# Patient Record
Sex: MALE | Race: WHITE | NOT HISPANIC OR LATINO | Employment: FULL TIME | URBAN - METROPOLITAN AREA
[De-identification: names, ages, dates, MRNs, and addresses within clinical notes are randomized per-mention and may not be internally consistent; named-entity substitution may affect disease eponyms.]

---

## 2021-12-27 ENCOUNTER — TELEPHONE (OUTPATIENT)
Dept: ORTHOPEDICS | Facility: CLINIC | Age: 31
End: 2021-12-27
Payer: COMMERCIAL

## 2022-01-03 ENCOUNTER — HOSPITAL ENCOUNTER (OUTPATIENT)
Dept: RADIOLOGY | Facility: HOSPITAL | Age: 32
Discharge: HOME | End: 2022-01-03
Attending: STUDENT IN AN ORGANIZED HEALTH CARE EDUCATION/TRAINING PROGRAM
Payer: COMMERCIAL

## 2022-01-03 ENCOUNTER — OFFICE VISIT (OUTPATIENT)
Dept: SPORTS MEDICINE | Facility: CLINIC | Age: 32
End: 2022-01-03
Payer: COMMERCIAL

## 2022-01-03 VITALS
WEIGHT: 257 LBS | BODY MASS INDEX: 31.29 KG/M2 | SYSTOLIC BLOOD PRESSURE: 140 MMHG | HEART RATE: 74 BPM | HEIGHT: 76 IN | DIASTOLIC BLOOD PRESSURE: 81 MMHG

## 2022-01-03 DIAGNOSIS — M25.461 EFFUSION OF RIGHT KNEE JOINT: Primary | ICD-10-CM

## 2022-01-03 DIAGNOSIS — Z98.890 S/P ACL RECONSTRUCTION: ICD-10-CM

## 2022-01-03 DIAGNOSIS — M25.561 RIGHT KNEE PAIN, UNSPECIFIED CHRONICITY: ICD-10-CM

## 2022-01-03 DIAGNOSIS — M25.361 KNEE INSTABILITY, RIGHT: ICD-10-CM

## 2022-01-03 PROCEDURE — 1160F RVW MEDS BY RX/DR IN RCRD: CPT | Mod: CPTII,S$GLB,, | Performed by: STUDENT IN AN ORGANIZED HEALTH CARE EDUCATION/TRAINING PROGRAM

## 2022-01-03 PROCEDURE — 99204 OFFICE O/P NEW MOD 45 MIN: CPT | Mod: S$GLB,,, | Performed by: STUDENT IN AN ORGANIZED HEALTH CARE EDUCATION/TRAINING PROGRAM

## 2022-01-03 PROCEDURE — 73564 X-RAY EXAM KNEE 4 OR MORE: CPT | Mod: TC,50

## 2022-01-03 PROCEDURE — 99999 PR PBB SHADOW E&M-EST. PATIENT-LVL III: CPT | Mod: PBBFAC,,, | Performed by: STUDENT IN AN ORGANIZED HEALTH CARE EDUCATION/TRAINING PROGRAM

## 2022-01-03 PROCEDURE — 1160F PR REVIEW ALL MEDS BY PRESCRIBER/CLIN PHARMACIST DOCUMENTED: ICD-10-PCS | Mod: CPTII,S$GLB,, | Performed by: STUDENT IN AN ORGANIZED HEALTH CARE EDUCATION/TRAINING PROGRAM

## 2022-01-03 PROCEDURE — 3079F DIAST BP 80-89 MM HG: CPT | Mod: CPTII,S$GLB,, | Performed by: STUDENT IN AN ORGANIZED HEALTH CARE EDUCATION/TRAINING PROGRAM

## 2022-01-03 PROCEDURE — 99999 PR PBB SHADOW E&M-EST. PATIENT-LVL III: ICD-10-PCS | Mod: PBBFAC,,, | Performed by: STUDENT IN AN ORGANIZED HEALTH CARE EDUCATION/TRAINING PROGRAM

## 2022-01-03 PROCEDURE — 3008F PR BODY MASS INDEX (BMI) DOCUMENTED: ICD-10-PCS | Mod: CPTII,S$GLB,, | Performed by: STUDENT IN AN ORGANIZED HEALTH CARE EDUCATION/TRAINING PROGRAM

## 2022-01-03 PROCEDURE — 3008F BODY MASS INDEX DOCD: CPT | Mod: CPTII,S$GLB,, | Performed by: STUDENT IN AN ORGANIZED HEALTH CARE EDUCATION/TRAINING PROGRAM

## 2022-01-03 PROCEDURE — 73564 X-RAY EXAM KNEE 4 OR MORE: CPT | Mod: 26,50,, | Performed by: RADIOLOGY

## 2022-01-03 PROCEDURE — 3079F PR MOST RECENT DIASTOLIC BLOOD PRESSURE 80-89 MM HG: ICD-10-PCS | Mod: CPTII,S$GLB,, | Performed by: STUDENT IN AN ORGANIZED HEALTH CARE EDUCATION/TRAINING PROGRAM

## 2022-01-03 PROCEDURE — 1159F PR MEDICATION LIST DOCUMENTED IN MEDICAL RECORD: ICD-10-PCS | Mod: CPTII,S$GLB,, | Performed by: STUDENT IN AN ORGANIZED HEALTH CARE EDUCATION/TRAINING PROGRAM

## 2022-01-03 PROCEDURE — 99204 PR OFFICE/OUTPT VISIT, NEW, LEVL IV, 45-59 MIN: ICD-10-PCS | Mod: S$GLB,,, | Performed by: STUDENT IN AN ORGANIZED HEALTH CARE EDUCATION/TRAINING PROGRAM

## 2022-01-03 PROCEDURE — 3077F PR MOST RECENT SYSTOLIC BLOOD PRESSURE >= 140 MM HG: ICD-10-PCS | Mod: CPTII,S$GLB,, | Performed by: STUDENT IN AN ORGANIZED HEALTH CARE EDUCATION/TRAINING PROGRAM

## 2022-01-03 PROCEDURE — 3077F SYST BP >= 140 MM HG: CPT | Mod: CPTII,S$GLB,, | Performed by: STUDENT IN AN ORGANIZED HEALTH CARE EDUCATION/TRAINING PROGRAM

## 2022-01-03 PROCEDURE — 1159F MED LIST DOCD IN RCRD: CPT | Mod: CPTII,S$GLB,, | Performed by: STUDENT IN AN ORGANIZED HEALTH CARE EDUCATION/TRAINING PROGRAM

## 2022-01-03 PROCEDURE — 73564 XR KNEE ORTHO BILAT WITH FLEXION: ICD-10-PCS | Mod: 26,50,, | Performed by: RADIOLOGY

## 2022-01-03 NOTE — PROGRESS NOTES
Subjective:          Chief Complaint: Cory Rodríguez is a 31 y.o. male who had concerns including Pain of the Right Knee.    Cory Rodríguez is a 31 y.o. male  at [facility] [employer] presents for evaluation of his right knee.  He has a history of a right knee ACL reconstruction with hamstring autograft about 12 years ago, he is unsure there any procedures performed on the meniscus or other knee structures..  Says he has always had occasional medial-sided pains and swelling to this knee.  On 12/21/2021 he slipped going down stairs and had a twisting mechanism to this right knee and began to feel immediate pain.  This was accompanied by swelling and an effusion.  He went to an urgent care where he was placed in a knee immobilizer.  He says he has had episodes of instability since, but no mechanical symptoms.  His knee has been swollen, particularly at the end of the day working.  He is here for evaluation and to discuss potential treatment options.  Denies any numbness or paresthesias to the right lower extremity.    Past Medical History:   Diagnosis Date    Asthma        Current Outpatient Medications on File Prior to Visit   Medication Sig Dispense Refill    albuterol 90 mcg/actuation inhaler Inhale 2 puffs into the lungs every 6 (six) hours as needed for Wheezing.       No current facility-administered medications on file prior to visit.       Past Surgical History:   Procedure Laterality Date    ANTERIOR CRUCIATE LIGAMENT REPAIR      BACK SURGERY      MOLE REMOVAL         History reviewed. No pertinent family history.    Social History     Socioeconomic History    Marital status: Single   Tobacco Use    Smoking status: Current Every Day Smoker    Smokeless tobacco: Current User   Substance and Sexual Activity    Alcohol use: No    Drug use: No       Review of Systems   Constitutional: Negative.   HENT: Negative.    Eyes: Negative.    Cardiovascular: Negative.    Respiratory: Negative.     Endocrine: Negative.    Hematologic/Lymphatic: Negative.    Skin: Negative.    Musculoskeletal: Positive for joint pain (Right knee) and joint swelling ( right knee). Negative for arthritis, falls, muscle weakness, myalgias and stiffness.   Neurological: Negative.    Psychiatric/Behavioral: Negative.    Allergic/Immunologic: Negative.        Pain Related Questions  Over the past 3 days, what was your average pain during activity? (I.e. running, jogging, walking, climbing stairs, getting dressed, ect.): 5  Over the past 3 days, what was your highest pain level?: 8  Over the past 3 days, what was your lowest pain level? : 5    Other  How many nights a week are you awakened by your affected body part?: 0      Objective:        General: Cory is well-developed, well-nourished, appears stated age, in no acute distress, alert and oriented to time, place and person.     General    Nursing note and vitals reviewed.  Constitutional: He is oriented to person, place, and time. He appears well-developed and well-nourished. No distress.   HENT:   Head: Normocephalic and atraumatic.   Nose: Nose normal.   Eyes: EOM are normal.   Cardiovascular: Normal rate and intact distal pulses.    Pulmonary/Chest: Effort normal. No respiratory distress.   Neurological: He is alert and oriented to person, place, and time.   Psychiatric: He has a normal mood and affect. His behavior is normal. Judgment and thought content normal.     General Musculoskeletal Exam   Gait: abnormal and antalgic       Right Knee Exam     Inspection   Erythema: absent  Scars: present (Previous ACL with hamstring harvest incisions are well healed)  Swelling: present  Effusion: present  Deformity: absent  Bruising: absent    Tenderness   The patient is tender to palpation of the medial joint line.    Range of Motion   Extension: 0   Flexion: 120     Tests   Meniscus   Anna:  Medial - positive Lateral - negative  Ligament Examination   Lachman: abnormal -  grade IIPCL-Posterior Drawer: normal (0 to 2mm)     MCL - Valgus: normal (0 to 2mm)  LCL - Varus: normal  Patella   Patellar apprehension: negative  Passive Patellar Tilt: neutral  Patellar Tracking: normal  Patellar Glide (quadrants): Lateral - 2   Medial - 2    Other   Sensation: normal    Left Knee Exam   Left knee exam is normal.    Inspection   Erythema: absent  Scars: absent  Swelling: absent  Effusion: absent  Deformity: absent  Bruising: absent    Tenderness   The patient is experiencing no tenderness.     Range of Motion   Extension: -5   Flexion: 140     Tests   Meniscus   Anna:  Medial - negative Lateral - negative  Stability Lachman: normal (-1 to 2mm) PCL-Posterior Drawer: normal (0 to 2mm)  MCL - Valgus: normal (0 to 2mm)  LCL - Varus: normal (0 to 2mm)  Patella   Patellar apprehension: negative  Passive Patellar Tilt: neutral  Patellar Tracking: normal  Patellar Glide (Quadrants): Lateral - 2 Medial - 2    Other   Sensation: normal    Muscle Strength   Right Lower Extremity   Quadriceps:  4/5   Hamstrin/5   Left Lower Extremity   Quadriceps:  5/5   Hamstrin/5     Vascular Exam     Right Pulses  Dorsalis Pedis:      2+  Posterior Tibial:      2+        Left Pulses  Dorsalis Pedis:      2+  Posterior Tibial:      2+        Edema  Right Lower Leg: absent  Left Lower Leg: absent    Imaging:  X-rays of the right knee from 2021 personally reviewed by me on that day.  These include weight-bearing AP, PA flexion, lateral, and Merchant views.  The right knee as evidence of a likely transtibial ACL reconstruction with suspensory fixation on the femur and suture post on the tibia.  The femoral tunnel is too high within the notch and vertical.  Femoral and tibial tunnels measure approximately 15.5 mm in diameter.  She there is well-preserved joint space in all 3 compartments.            Assessment:     Cory Rodríguez is a 31 y.o. male about 12 years status post ACL reconstruction  presents with right knee pain and some subjective instability.  Encounter Diagnoses   Name Primary?    Effusion of right knee joint Yes    Knee instability, right     S/P ACL reconstruction           Plan:       Given his exam and history, we will obtain an MRI of the right knee to evaluate the integrity of the ACL graft as well as the medial meniscus and MCL.  He will return to clinic after recess the findings and potential treatment options.    All of their questions were answered.  They will call the clinic with any questions or concerns in the interim.    Should the patient's symptoms worsen, persist, or fail to improve they should return for reevaluation and I would be happy to see them back anytime.        Andrzej Figueroa M.D.     Please be aware that this note has been generated with the assistance of [employer] voice-to-text.  Please excuse any spelling or grammatical errors.    Thank you for choosing Dr. Andrzej Figueroa for your sports medicine care. It is our goal to provide you with exceptional care that will help keep you healthy, active, and get you back in the game.     If you felt that you received exemplary care today, please consider leaving feedback for Dr. Figueroa on [employer] at [email or web address].    Please do not hesitate to reach out to us via email, phone, or MyChart with any questions, concerns, or feedback.

## 2025-05-08 ENCOUNTER — HOSPITAL ENCOUNTER (EMERGENCY)
Facility: HOSPITAL | Age: 35
Discharge: HOME | End: 2025-05-08
Attending: EMERGENCY MEDICINE
Payer: COMMERCIAL

## 2025-05-08 VITALS
HEART RATE: 59 BPM | DIASTOLIC BLOOD PRESSURE: 80 MMHG | TEMPERATURE: 98 F | BODY MASS INDEX: 35.64 KG/M2 | WEIGHT: 268.94 LBS | OXYGEN SATURATION: 99 % | SYSTOLIC BLOOD PRESSURE: 145 MMHG | HEIGHT: 73 IN | RESPIRATION RATE: 16 BRPM

## 2025-05-08 DIAGNOSIS — J36 PERITONSILLAR ABSCESS: Primary | ICD-10-CM

## 2025-05-08 PROCEDURE — 25000003 PHARM REV CODE 250

## 2025-05-08 PROCEDURE — 42700 I&D ABSCESS PERITONSILLAR: CPT | Mod: RT

## 2025-05-08 PROCEDURE — 99284 EMERGENCY DEPT VISIT MOD MDM: CPT | Mod: 25

## 2025-05-08 RX ORDER — METHYLPREDNISOLONE 4 MG/1
TABLET ORAL
Qty: 21 TABLET | Refills: 0 | Status: SHIPPED | OUTPATIENT
Start: 2025-05-08 | End: 2025-05-08

## 2025-05-08 RX ORDER — METHYLPREDNISOLONE 4 MG/1
TABLET ORAL
Qty: 21 TABLET | Refills: 0 | Status: SHIPPED | OUTPATIENT
Start: 2025-05-08 | End: 2025-05-29

## 2025-05-08 RX ORDER — ACETAMINOPHEN 500 MG
1000 TABLET ORAL
Status: COMPLETED | OUTPATIENT
Start: 2025-05-08 | End: 2025-05-08

## 2025-05-08 RX ORDER — CLINDAMYCIN HYDROCHLORIDE 150 MG/1
450 CAPSULE ORAL EVERY 8 HOURS
Qty: 126 CAPSULE | Refills: 0 | Status: SHIPPED | OUTPATIENT
Start: 2025-05-08 | End: 2025-05-22

## 2025-05-08 RX ORDER — CLINDAMYCIN HYDROCHLORIDE 150 MG/1
450 CAPSULE ORAL EVERY 8 HOURS
Qty: 126 CAPSULE | Refills: 0 | Status: SHIPPED | OUTPATIENT
Start: 2025-05-08 | End: 2025-05-08

## 2025-05-08 RX ADMIN — ACETAMINOPHEN 1000 MG: 500 TABLET ORAL at 07:05

## 2025-05-08 NOTE — CONSULTS
[facility] - Emergency Dept  Otorhinolaryngology-Head & Neck Surgery  Consult Note    Patient Name: Cory Rodríguez  MRN: [id number]  Code Status: No Order  Admission Date: 5/8/2025  Hospital Length of Stay: 0 days  Attending Physician: Phan Gold III, MD  Primary Care Provider: Mohini, Primary Doctor    Inpatient consult to ENT  Consult performed by: Crystal Gomez MD  Consult ordered by: Nia Javier MD        Subjective:     Chief Complaint/Reason for Admission: Sore throat    History of Present Illness:   Cory Rodríguez is 35 y.o. male that presents with a sore throat. Symptoms began 7 days ago now the pain is moderate. The pain is primarily on (the) right side. ENT consulted given concern regarding a possible peritonsillar abscess.    The patient has been treated with the following antibiotics: Azithromycin. Current OTC  medications include none. There has not been improvement with this treatment regimen.      does not report fever/chills  does report odynophagia, dysphagia  does not report muffled voice  does not report trismus  does tolerate secretions  does not report trouble breathing  has not received antibiotics for this acute infection previously  does not report prior similar infections in the past.   does not have a history of abnormal bleeding, is not on blood thinners    Medications:  Continuous Infusions:  Scheduled Meds:   acetaminophen  1,000 mg Oral ED 1 Time     PRN Meds:     Current Facility-Administered Medications on File Prior to Encounter   Medication    [COMPLETED] clindamycin in D5W 600 mg/50 mL IVPB 600 mg    [COMPLETED] dexAMETHasone injection 8 mg    [COMPLETED] iohexoL (OMNIPAQUE 350) injection 80 mL    [COMPLETED] ketorolac injection 15 mg    [COMPLETED] oxyCODONE-acetaminophen 5-325 mg per tablet 1 tablet     Current Outpatient Medications on File Prior to Encounter   Medication Sig    albuterol 90 mcg/actuation inhaler Inhale 2 puffs into the lungs every 6 (six) hours  as needed for Wheezing.       Review of patient's allergies indicates:   Allergen Reactions    Penicillins Anaphylaxis       Past Medical History:   Diagnosis Date    Asthma      Past Surgical History:   Procedure Laterality Date    ANTERIOR CRUCIATE LIGAMENT REPAIR      BACK SURGERY      MOLE REMOVAL       Family History    None       Tobacco Use    Smoking status: Every Day    Smokeless tobacco: Current   Substance and Sexual Activity    Alcohol use: No    Drug use: No    Sexual activity: Not on file       Review of Systems  Constitutional:  Positive for appetite change. Negative for chills and fever.   HENT:  Positive for sore throat and trouble swallowing. Negative for congestion, drooling, ear pain, facial swelling and voice change.    Respiratory:  Negative for choking, shortness of breath and stridor.    Gastrointestinal:  Negative for nausea and vomiting.   Musculoskeletal:  Negative for neck pain and neck stiffness.     Objective:     Vital Signs (Most Recent):  Temp: 98.2 °F (36.8 °C) (05/08/25 0600)  Pulse: 63 (05/08/25 0700)  Resp: 18 (05/08/25 0700)  BP: (!) 148/80 (05/08/25 0700)  SpO2: 99 % (05/08/25 0700) Vital Signs (24h Range):  Temp:  [97.2 °F (36.2 °C)-98.8 °F (37.1 °C)] 98.2 °F (36.8 °C)  Pulse:  [47-72] 63  Resp:  [18] 18  SpO2:  [95 %-100 %] 99 %  BP: (101-158)/(62-97) 148/80     Weight: 122 kg (268 lb 15.4 oz)  Body mass index is 35.49 kg/m².        Physical Exam    NAD  Awake and alert  Normal WOB, no stridor or stertor on room air   MMM, anterior tongue mobile, FOM soft  Tonsils 2+, asymmetric R>L with erythema of soft palate  Neck soft, mildly TTP diffusely, normal ROM     Procedure Note: Incision and Drainage of Peritonsillar Abscess  After informed consent was obtained, the  superior aspect of the anterior tonsillar pillar was anesthetized with 2 cc 1% lidocaine with epinephrine. The affected area was then initially drained using needle aspiration, with return of 5 ccpurulence material.  Next, the mucosa was incised with an 11 blade. This was sent for culture. The patient tolerated the procedure well, and was without apparent complication.    Labs:  Recent Labs   Lab 05/08/25  0221   WBC 9.94   RBC 4.76   HGB 14.5   HCT 43.8      MCV 92   MCH 30.5   MCHC 33.1         Imaging:  CT Soft Tissue Neck With Contrast  Result Date: 5/8/2025  Bilateral tonsillar enlargement. Right peritonsillar rim enhancing abscess measuring 2.4 x 2.9 x 2.6 cm in size.  Combination of bilateral tonsillar enlargement and right peritonsillar abscess results in narrowing of the oropharyngeal/hypopharyngeal airway. This report was flagged in Epic as abnormal. The critical information above was relayed directly by Dannie Fu MD via [employer] secure chat to Beltran Leonard on 5/8/2025 at 03:32. Electronically signed by: Dannie Fu MD Date:    05/08/2025 Time:    03:33        Assessment/Plan:     1. Peritonsillar abscess        Cory Rodríguez is a 35 y.o. male that presents to hospital with sore throat. ENT consulted due to concern for peritonsillar abscess. Now s/p bedside I&D. Patient pen allergic     - Recommend discharge on   --Clindamycin 300 mg PO QID x10 days  --Consider steroid taper with decadron 2mg tablets: TID po x2 days, then BID po x 2 days, then once daily x 2 days then stop  - Discussed return precautions including bleeding, SOB, voice changes, neck stiffness/pain, poor po intake, enlarging mass/neck swelling, voiced understanding   - Will arrange for outpatient follow-up to discuss possible future tonsillectomy at least 6wks following acute infection  - Remainder of care per ED  - Please page ENT with any questions or concerns     VTE Risk Mitigation (From admission, onward)      None            Thank you for your consult. I will sign off. Please contact us if you have any additional questions.    Crystal Gomez MD  Otorhinolaryngology-Head & Neck Surgery  [facility] - Emergency Dept

## 2025-05-08 NOTE — ED NOTES
Assumed care for pt after recieving report from previous RN. Pt resting in bed in NAD, RR e/u. Vital signs stable and WDL at this time of assessment. Pt offered bathroom assistance and denies need at this time. Patient ambulates independently with steady gait. Explanation of care/wait provided. Pt verbalizes no needs at this time. Bed in low, locked position with rails up and call bell in reach. Pt's white board updated with today's care team and plan.

## 2025-05-08 NOTE — DISCHARGE INSTRUCTIONS
Please return to the emergency department if you develop shortness of breath, chest pain or any other new or concerning symptoms.    You have been prescribed with clindamycin.  Able to pharmacy to obtain medication.  Take full course of antibiotics as prescribed even if you start feeling better before hand.    He has been prescribed with Medrol Dosepak; take as prescribed.    Take Tylenol and ibuprofen for pain at home.

## 2025-05-08 NOTE — Clinical Note
"Cory Gallo" Marcos was seen and treated in our emergency department on 5/8/2025.  He may return to work on 05/09/2025.       If you have any questions or concerns, please don't hesitate to call.      Nia Javier MD"

## 2025-05-08 NOTE — ED TRIAGE NOTES
Pt arrived to the ED via EMS for right sided neck, jaw, and ear pain. Pt states he started having pains about a week ago. Endorses discomfort with swallowing. PCP prescribed abx 5/5/25. States last night around 11pm the pain got severe and went to the ED in [place]. [facility] did CT and dx with peritonsillar abscess. Denies fever, chills. No other complaints at this time.

## 2025-05-08 NOTE — ED PROVIDER NOTES
Encounter Date: 5/8/2025       History     Chief Complaint   Patient presents with    transfer     Pt. Transfer from [place] for peritonsilar abscess and ENT consult.      Patient is a 35-year-old male with no significant past medical history who arrived his transfer for PTA drainage.  States having some painful swallowing, no respiratory distress.  States also having some right ear pain.  Patient is transferred to ED for evaluation by ENT and likely drainage of PTA.  Patient denies any dyspnea, fever, chills.      Review of patient's allergies indicates:   Allergen Reactions    Penicillins Anaphylaxis     Past Medical History:   Diagnosis Date    Asthma      Past Surgical History:   Procedure Laterality Date    ANTERIOR CRUCIATE LIGAMENT REPAIR      BACK SURGERY      MOLE REMOVAL       No family history on file.  Social History[1]  Review of Systems    Physical Exam     Initial Vitals [05/08/25 0546]   BP Pulse Resp Temp SpO2   (!) 148/82 (!) 52 18 98.8 °F (37.1 °C) 99 %      MAP       --         Physical Exam    Nursing note and vitals reviewed.  Constitutional: He appears well-developed and well-nourished. He is not diaphoretic. No distress.   HENT:   Head: Normocephalic and atraumatic.   Right Ear: External ear normal.   Left Ear: External ear normal.   Nose: Nose normal.   Unable to visualize oropharynx due to anatomy (unable to open mouth wide enough to visualize oropharynx).    No erythema of tympanic membranes bilaterally.   Eyes: Conjunctivae are normal. Right eye exhibits no discharge. Left eye exhibits no discharge. No scleral icterus.   Cardiovascular:  Regular rhythm and normal heart sounds.           Bradycardia.   Pulmonary/Chest: Breath sounds normal. No respiratory distress. He has no wheezes. He has no rhonchi. He has no rales.   Abdominal: Abdomen is soft. He exhibits no distension. There is no abdominal tenderness. There is no rebound and no guarding.   Musculoskeletal:         General: No  edema. Normal range of motion.     Neurological: He is alert and oriented to person, place, and time. He has normal strength.   Skin: Capillary refill takes less than 2 seconds. No rash and no abscess noted. No erythema. No pallor.   Psychiatric: He has a normal mood and affect.         ED Course   Procedures  Labs Reviewed - No data to display         Imaging Results    None          Medications   acetaminophen tablet 1,000 mg (1,000 mg Oral Given 5/8/25 0857)     Medical Decision Making  Patient is a 35-year-old male with history as stated above.    Differential diagnosis includes, but is not limited to:    -PTA  -otitis media  -pharyngitis      Management:     Consulted ENT.  Case discussed with ENT who evaluated patient in ED and drained abscess.  Patient provided with prescription for clindamycin given that patient has allergies to penicillin.  Also provided with Medrol pack at recommendation by ENT.  Patient discharged with return precautions explained in face-to-face conversation.    Risk  OTC drugs.  Prescription drug management.               ED Course as of 05/08/25 1654   Thu May 08, 2025   4599 Patient is a 35-year-old male    Arrived his transfer for PTA drainage.  States having some painful swallowing, no respiratory distress. [AG]      ED Course User Index  [AG] Nia Javier MD                           Clinical Impression:  Final diagnoses:  [J36] Peritonsillar abscess (Primary)          ED Disposition Condition    Discharge Stable          ED Prescriptions       Medication Sig Dispense Start Date End Date Auth. Provider    clindamycin (CLEOCIN) 150 MG capsule  (Status: Discontinued) Take 3 capsules (450 mg total) by mouth every 8 (eight) hours. for 14 days 126 capsule 5/8/2025 5/8/2025 Nia Javier MD    methylPREDNISolone (MEDROL DOSEPACK) 4 mg tablet  (Status: Discontinued) Take medication as directed on instructions on box. 21 tablet 5/8/2025 5/8/2025 Nia Javier MD    clindamycin  (CLEOCIN) 150 MG capsule Take 3 capsules (450 mg total) by mouth every 8 (eight) hours. for 14 days 126 capsule 5/8/2025 5/22/2025 Nia Javier MD    methylPREDNISolone (MEDROL DOSEPACK) 4 mg tablet Take medication as directed on instructions on box. 21 tablet 5/8/2025 5/29/2025 Nia Javier MD          Follow-up Information       Follow up With Specialties Details Why Contact Info Additional Information    [facility] - Emergency Dept Emergency Medicine  As needed, If symptoms worsen [address] [facility] [id number] [phone]     [facility] - [facility] Otolaryngology Schedule an appointment as soon as possible for a visit in 1 week  [address] [facility] [phone] Ear, Nose & Throat Services - Main Building, 4th Floor Please park in [facility] and use Clinic elevator                 [1]   Social History  Tobacco Use    Smoking status: Every Day    Smokeless tobacco: Current   Substance Use Topics    Alcohol use: No    Drug use: No        Nia Javier MD  Resident  05/08/25 8813